# Patient Record
Sex: FEMALE | Race: WHITE | Employment: OTHER | URBAN - METROPOLITAN AREA
[De-identification: names, ages, dates, MRNs, and addresses within clinical notes are randomized per-mention and may not be internally consistent; named-entity substitution may affect disease eponyms.]

---

## 2018-08-23 ENCOUNTER — ANESTHESIA (OUTPATIENT)
Dept: OPERATING ROOM | Age: 73
End: 2018-08-23
Payer: MEDICARE

## 2018-08-23 ENCOUNTER — ANESTHESIA EVENT (OUTPATIENT)
Dept: OPERATING ROOM | Age: 73
End: 2018-08-23
Payer: MEDICARE

## 2018-08-23 ENCOUNTER — HOSPITAL ENCOUNTER (OUTPATIENT)
Age: 73
Setting detail: OUTPATIENT SURGERY
Discharge: HOME | End: 2018-08-23
Attending: OPHTHALMOLOGY | Admitting: OPHTHALMOLOGY
Payer: MEDICARE

## 2018-08-23 VITALS
HEART RATE: 69 BPM | WEIGHT: 162 LBS | HEIGHT: 59 IN | OXYGEN SATURATION: 97 % | DIASTOLIC BLOOD PRESSURE: 65 MMHG | RESPIRATION RATE: 16 BRPM | TEMPERATURE: 97.9 F | SYSTOLIC BLOOD PRESSURE: 135 MMHG | BODY MASS INDEX: 32.66 KG/M2

## 2018-08-23 VITALS — OXYGEN SATURATION: 100 % | SYSTOLIC BLOOD PRESSURE: 161 MMHG | DIASTOLIC BLOOD PRESSURE: 69 MMHG

## 2018-08-23 PROBLEM — H43.821 VITREOMACULAR ADHESION OF RIGHT EYE: Status: ACTIVE | Noted: 2018-08-23

## 2018-08-23 LAB
EKG ATRIAL RATE: 64 BPM
EKG P AXIS: 71 DEGREES
EKG P-R INTERVAL: 152 MS
EKG Q-T INTERVAL: 398 MS
EKG QRS DURATION: 76 MS
EKG QTC CALCULATION (BAZETT): 410 MS
EKG R AXIS: 1 DEGREES
EKG T AXIS: 26 DEGREES
EKG VENTRICULAR RATE: 64 BPM

## 2018-08-23 PROCEDURE — 3600000004 HC SURGERY LEVEL 4 BASE: Performed by: OPHTHALMOLOGY

## 2018-08-23 PROCEDURE — 2580000003 HC RX 258: Performed by: ANESTHESIOLOGY

## 2018-08-23 PROCEDURE — 2709999900 HC NON-CHARGEABLE SUPPLY: Performed by: OPHTHALMOLOGY

## 2018-08-23 PROCEDURE — 7100000040 HC SPAR PHASE II RECOVERY - FIRST 15 MIN: Performed by: OPHTHALMOLOGY

## 2018-08-23 PROCEDURE — 2500000003 HC RX 250 WO HCPCS: Performed by: OPHTHALMOLOGY

## 2018-08-23 PROCEDURE — 2500000003 HC RX 250 WO HCPCS: Performed by: NURSE ANESTHETIST, CERTIFIED REGISTERED

## 2018-08-23 PROCEDURE — 7100000041 HC SPAR PHASE II RECOVERY - ADDTL 15 MIN: Performed by: OPHTHALMOLOGY

## 2018-08-23 PROCEDURE — 3600000014 HC SURGERY LEVEL 4 ADDTL 15MIN: Performed by: OPHTHALMOLOGY

## 2018-08-23 PROCEDURE — 3700000000 HC ANESTHESIA ATTENDED CARE: Performed by: OPHTHALMOLOGY

## 2018-08-23 PROCEDURE — 6360000002 HC RX W HCPCS: Performed by: NURSE ANESTHETIST, CERTIFIED REGISTERED

## 2018-08-23 PROCEDURE — 93005 ELECTROCARDIOGRAM TRACING: CPT

## 2018-08-23 PROCEDURE — 3700000001 HC ADD 15 MINUTES (ANESTHESIA): Performed by: OPHTHALMOLOGY

## 2018-08-23 PROCEDURE — 6370000000 HC RX 637 (ALT 250 FOR IP): Performed by: OPHTHALMOLOGY

## 2018-08-23 PROCEDURE — 6360000002 HC RX W HCPCS: Performed by: OPHTHALMOLOGY

## 2018-08-23 RX ORDER — OXYCODONE HYDROCHLORIDE AND ACETAMINOPHEN 5; 325 MG/1; MG/1
2 TABLET ORAL PRN
Status: DISCONTINUED | OUTPATIENT
Start: 2018-08-23 | End: 2018-08-23 | Stop reason: HOSPADM

## 2018-08-23 RX ORDER — ATROPINE SULFATE 10 MG/ML
1 SOLUTION/ DROPS OPHTHALMIC
Status: COMPLETED | OUTPATIENT
Start: 2018-08-23 | End: 2018-08-23

## 2018-08-23 RX ORDER — MORPHINE SULFATE 2 MG/ML
2 INJECTION, SOLUTION INTRAMUSCULAR; INTRAVENOUS EVERY 5 MIN PRN
Status: DISCONTINUED | OUTPATIENT
Start: 2018-08-23 | End: 2018-08-23 | Stop reason: HOSPADM

## 2018-08-23 RX ORDER — LIDOCAINE HYDROCHLORIDE 20 MG/ML
INJECTION, SOLUTION INFILTRATION; PERINEURAL PRN
Status: DISCONTINUED | OUTPATIENT
Start: 2018-08-23 | End: 2018-08-23 | Stop reason: HOSPADM

## 2018-08-23 RX ORDER — TETRACAINE HYDROCHLORIDE 5 MG/ML
SOLUTION OPHTHALMIC PRN
Status: DISCONTINUED | OUTPATIENT
Start: 2018-08-23 | End: 2018-08-23 | Stop reason: HOSPADM

## 2018-08-23 RX ORDER — TOBRAMYCIN AND DEXAMETHASONE 3; 1 MG/ML; MG/ML
1 SUSPENSION/ DROPS OPHTHALMIC
Status: COMPLETED | OUTPATIENT
Start: 2018-08-23 | End: 2018-08-23

## 2018-08-23 RX ORDER — ERYTHROMYCIN 5 MG/G
OINTMENT OPHTHALMIC PRN
Status: DISCONTINUED | OUTPATIENT
Start: 2018-08-23 | End: 2018-08-23 | Stop reason: HOSPADM

## 2018-08-23 RX ORDER — GENTAMICIN SULFATE 40 MG/ML
INJECTION, SOLUTION INTRAMUSCULAR; INTRAVENOUS PRN
Status: DISCONTINUED | OUTPATIENT
Start: 2018-08-23 | End: 2018-08-23 | Stop reason: HOSPADM

## 2018-08-23 RX ORDER — TROPICAMIDE 10 MG/ML
1 SOLUTION/ DROPS OPHTHALMIC EVERY 5 MIN PRN
Status: COMPLETED | OUTPATIENT
Start: 2018-08-23 | End: 2018-08-23

## 2018-08-23 RX ORDER — ALBUTEROL SULFATE 90 UG/1
2 AEROSOL, METERED RESPIRATORY (INHALATION) EVERY 6 HOURS PRN
COMMUNITY
Start: 2016-05-26

## 2018-08-23 RX ORDER — PHENYLEPHRINE HYDROCHLORIDE 100 MG/ML
1 SOLUTION/ DROPS OPHTHALMIC EVERY 5 MIN PRN
Status: COMPLETED | OUTPATIENT
Start: 2018-08-23 | End: 2018-08-23

## 2018-08-23 RX ORDER — DIPHENHYDRAMINE HYDROCHLORIDE 50 MG/ML
12.5 INJECTION INTRAMUSCULAR; INTRAVENOUS
Status: DISCONTINUED | OUTPATIENT
Start: 2018-08-23 | End: 2018-08-23 | Stop reason: HOSPADM

## 2018-08-23 RX ORDER — LABETALOL HYDROCHLORIDE 5 MG/ML
5 INJECTION, SOLUTION INTRAVENOUS EVERY 10 MIN PRN
Status: DISCONTINUED | OUTPATIENT
Start: 2018-08-23 | End: 2018-08-23 | Stop reason: HOSPADM

## 2018-08-23 RX ORDER — BALANCED SALT SOLUTION ENRICHED WITH BICARBONATE, DEXTROSE, AND GLUTATHIONE
KIT INTRAOCULAR PRN
Status: DISCONTINUED | OUTPATIENT
Start: 2018-08-23 | End: 2018-08-23 | Stop reason: HOSPADM

## 2018-08-23 RX ORDER — FENTANYL CITRATE 50 UG/ML
25 INJECTION, SOLUTION INTRAMUSCULAR; INTRAVENOUS EVERY 5 MIN PRN
Status: DISCONTINUED | OUTPATIENT
Start: 2018-08-23 | End: 2018-08-23 | Stop reason: HOSPADM

## 2018-08-23 RX ORDER — SODIUM CHLORIDE, SODIUM LACTATE, POTASSIUM CHLORIDE, CALCIUM CHLORIDE 600; 310; 30; 20 MG/100ML; MG/100ML; MG/100ML; MG/100ML
INJECTION, SOLUTION INTRAVENOUS CONTINUOUS
Status: DISCONTINUED | OUTPATIENT
Start: 2018-08-23 | End: 2018-08-23 | Stop reason: HOSPADM

## 2018-08-23 RX ORDER — PROPOFOL 10 MG/ML
INJECTION, EMULSION INTRAVENOUS PRN
Status: DISCONTINUED | OUTPATIENT
Start: 2018-08-23 | End: 2018-08-23 | Stop reason: SDUPTHER

## 2018-08-23 RX ORDER — LIDOCAINE HYDROCHLORIDE 10 MG/ML
INJECTION, SOLUTION EPIDURAL; INFILTRATION; INTRACAUDAL; PERINEURAL PRN
Status: DISCONTINUED | OUTPATIENT
Start: 2018-08-23 | End: 2018-08-23 | Stop reason: SDUPTHER

## 2018-08-23 RX ORDER — BUPIVACAINE HYDROCHLORIDE 7.5 MG/ML
INJECTION, SOLUTION EPIDURAL; RETROBULBAR PRN
Status: DISCONTINUED | OUTPATIENT
Start: 2018-08-23 | End: 2018-08-23 | Stop reason: HOSPADM

## 2018-08-23 RX ORDER — LEVOTHYROXINE SODIUM 50 UG/1
CAPSULE ORAL
COMMUNITY
Start: 2018-03-23

## 2018-08-23 RX ORDER — ONDANSETRON 2 MG/ML
4 INJECTION INTRAMUSCULAR; INTRAVENOUS
Status: DISCONTINUED | OUTPATIENT
Start: 2018-08-23 | End: 2018-08-23 | Stop reason: HOSPADM

## 2018-08-23 RX ORDER — MIDAZOLAM HYDROCHLORIDE 1 MG/ML
INJECTION INTRAMUSCULAR; INTRAVENOUS PRN
Status: DISCONTINUED | OUTPATIENT
Start: 2018-08-23 | End: 2018-08-23 | Stop reason: SDUPTHER

## 2018-08-23 RX ORDER — OXYCODONE HYDROCHLORIDE AND ACETAMINOPHEN 5; 325 MG/1; MG/1
1 TABLET ORAL PRN
Status: DISCONTINUED | OUTPATIENT
Start: 2018-08-23 | End: 2018-08-23 | Stop reason: HOSPADM

## 2018-08-23 RX ADMIN — LIDOCAINE HYDROCHLORIDE 20 MG: 10 INJECTION, SOLUTION EPIDURAL; INFILTRATION; INTRACAUDAL; PERINEURAL at 10:03

## 2018-08-23 RX ADMIN — TROPICAMIDE 1 DROP: 10 SOLUTION/ DROPS OPHTHALMIC at 08:38

## 2018-08-23 RX ADMIN — TROPICAMIDE 1 DROP: 10 SOLUTION/ DROPS OPHTHALMIC at 08:49

## 2018-08-23 RX ADMIN — PHENYLEPHRINE HYDROCHLORIDE 1 DROP: 100 SOLUTION/ DROPS OPHTHALMIC at 08:39

## 2018-08-23 RX ADMIN — PROPOFOL 8 MG: 10 INJECTION, EMULSION INTRAVENOUS at 10:03

## 2018-08-23 RX ADMIN — MIDAZOLAM HYDROCHLORIDE 1 MG: 1 INJECTION, SOLUTION INTRAMUSCULAR; INTRAVENOUS at 09:58

## 2018-08-23 RX ADMIN — SODIUM CHLORIDE, POTASSIUM CHLORIDE, SODIUM LACTATE AND CALCIUM CHLORIDE: 600; 310; 30; 20 INJECTION, SOLUTION INTRAVENOUS at 09:03

## 2018-08-23 RX ADMIN — TROPICAMIDE 1 DROP: 10 SOLUTION/ DROPS OPHTHALMIC at 08:55

## 2018-08-23 RX ADMIN — PHENYLEPHRINE HYDROCHLORIDE 1 DROP: 100 SOLUTION/ DROPS OPHTHALMIC at 08:49

## 2018-08-23 RX ADMIN — PHENYLEPHRINE HYDROCHLORIDE 1 DROP: 100 SOLUTION/ DROPS OPHTHALMIC at 08:55

## 2018-08-23 RX ADMIN — SODIUM CHLORIDE, POTASSIUM CHLORIDE, SODIUM LACTATE AND CALCIUM CHLORIDE: 600; 310; 30; 20 INJECTION, SOLUTION INTRAVENOUS at 09:54

## 2018-08-23 RX ADMIN — TOBRAMYCIN AND DEXAMETHASONE 1 DROP: 3; 1 SUSPENSION/ DROPS OPHTHALMIC at 09:04

## 2018-08-23 RX ADMIN — ATROPINE SULFATE 1 DROP: 10 SOLUTION/ DROPS OPHTHALMIC at 08:37

## 2018-08-23 ASSESSMENT — PULMONARY FUNCTION TESTS
PIF_VALUE: 0
PIF_VALUE: 1
PIF_VALUE: 0
PIF_VALUE: 1
PIF_VALUE: 0
PIF_VALUE: 1
PIF_VALUE: 0
PIF_VALUE: 1
PIF_VALUE: 0
PIF_VALUE: 1
PIF_VALUE: 1
PIF_VALUE: 0
PIF_VALUE: 1
PIF_VALUE: 0
PIF_VALUE: 1

## 2018-08-23 ASSESSMENT — PAIN SCALES - GENERAL
PAINLEVEL_OUTOF10: 0

## 2018-08-23 ASSESSMENT — PAIN DESCRIPTION - PAIN TYPE: TYPE: SURGICAL PAIN

## 2018-08-23 ASSESSMENT — PAIN - FUNCTIONAL ASSESSMENT: PAIN_FUNCTIONAL_ASSESSMENT: 0-10

## 2018-08-23 NOTE — ANESTHESIA PRE PROCEDURE
 HERNIA REPAIR  2016    abdominal    HYSTERECTOMY, TOTAL ABDOMINAL  1994    INCONTINENCE SURGERY  2000    OVARY REMOVAL      SHOULDER ARTHROSCOPY Left 2016    TONSILLECTOMY  1979    TUBAL LIGATION       Social History   Substance Use Topics    Smoking status: Former Smoker     Packs/day: 1.00     Years: 12.00     Quit date: 2001    Smokeless tobacco: Never Used      Comment: 1 pack / day from age 40 to 2001    Alcohol use No         Vital Signs (Current)   Vitals:    18 0848   BP: (!) 140/69   Pulse: 65   Resp: 20   Temp: 97.2 °F (36.2 °C)   SpO2: 97%     Vital Signs Statistics (for past 48 hrs)     Temp  Av.2 °F (36.2 °C)  Min: 97.2 °F (36.2 °C)   Min taken time: 18 0848  Max: 97.2 °F (36.2 °C)   Max taken time: 18 0848  Pulse  Av  Min: 72   Min taken time: 18 0848  Max: 72   Max taken time: 18 0848  Resp  Av  Min: 21   Min taken time: 18 0848  Max: 21   Max taken time: 18 0848  BP  Min: 140/69   Min taken time: 18 0848  Max: 140/69   Max taken time: 18 0848  SpO2  Av %  Min: 97 %   Min taken time: 18 0848  Max: 97 %   Max taken time: 18 0848  BP Readings from Last 3 Encounters:   18 (!) 140/69   16 109/65       BMI  Body mass index is 32.72 kg/m². CBC No results found for: WBC, RBC, HGB, HCT, MCV, RDW, PLT    CMP  No results found for: NA, K, CL, CO2, BUN, CREATININE, GFRAA, AGRATIO, LABGLOM, GLUCOSE, PROT, CALCIUM, BILITOT, ALKPHOS, AST, ALT    BMP  No results found for: NA, K, CL, CO2, BUN, CREATININE, CALCIUM, GFRAA, LABGLOM, GLUCOSE    POC Testing  No results for input(s): POCGLU, POCNA, POCK, POCCL, POCBUN, POCHEMO, POCHCT in the last 72 hours.     Coags  No results found for: PROTIME, INR, APTT    HCG (If Applicable) No results found for: PREGTESTUR, PREGSERUM, HCG, HCGQUANT     ABGs No results found for: PHART, PO2ART, OII3BXD, MHI6AMO, BEART, I5SXFDYU     Type & Screen (If Applicable)  No results found for: LABABO, [address]    Radiology (If Applicable)    Cardiac Testing (If Applicable)     EKG (If Applicable) nl          Medications prior to admission:   Prior to Admission medications    Medication Sig Start Date End Date Taking?  Authorizing Provider   Levothyroxine Sodium (TIROSINT) 50 MCG CAPS TAKE ONE CAPSULE BY MOUTH DAILY 3/23/18  Yes Historical Provider, MD   albuterol sulfate  (90 Base) MCG/ACT inhaler Inhale 2 puffs into the lungs every 6 hours as needed  5/26/16  Yes Historical Provider, MD   aspirin 81 MG EC tablet Take 81 mg by mouth daily   Yes Historical Provider, MD   CALCIUM & MAGNESIUM CARBONATES PO Take 1 tablet by mouth daily    Yes Historical Provider, MD   Fiber Complete TABS Take 1 tablet by mouth daily    Yes Historical Provider, MD   Cholecalciferol (VITAMIN D) 2000 UNITS CAPS capsule Take by mouth   Yes Historical Provider, MD   atenolol (TENORMIN) 50 MG tablet Take 50 mg by mouth daily   Yes Historical Provider, MD   lisinopril (PRINIVIL;ZESTRIL) 20 MG tablet Take 20 mg by mouth daily   Yes Historical Provider, MD   lansoprazole (PREVACID) 15 MG capsule Take 15 mg by mouth daily   Yes Historical Provider, MD   miconazole nitrate (LOTRIMIN AF) 2 % AERP Apply topically 2 times daily as needed     Historical Provider, MD   Clobetasol Propionate 0.05 % LIQD Apply topically prn    Historical Provider, MD       Current medications:    Current Facility-Administered Medications   Medication Dose Route Frequency Provider Last Rate Last Dose    tobramycin-dexamethasone (TOBRADEX) ophthalmic suspension 1 drop  1 drop [facility] On [facility] to [address] [place] [address], MD        tropicamide (MYDRIACYL) 1 % ophthalmic solution 1 drop  1 drop Right [facility] Q5 Min PRN Didi Red MD   1 drop at 08/23/18 0849    phenylephrine (CHERYL-SYNEPHRINE) 10 % ophthalmic solution 1 drop  1 drop Right Eye Q5 Min PRN Didi Red MD   1 drop at 08/23/18 0652 Allergies: Allergies   Allergen Reactions    Vicodin [Hydrocodone-Acetaminophen] Nausea Only       Problem List:    Patient Active Problem List   Diagnosis Code    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 .. WNL. Irwin Lemon ...  \" felt like heart jumped \"    Pneumonia     Sinusitis     Snores        Past Surgical History:        Procedure Laterality Date    ABDOMINAL ADHESION SURGERY  06/2016    for vaginal prolapse, scar tissue, hernias, rectocele, bowel adhesions    APPENDECTOMY  1950    BACK SURGERY  2010    CHOLECYSTECTOMY  2002    COLONOSCOPY  2014    HERNIA REPAIR  11/2016    abdominal    HYSTERECTOMY, TOTAL ABDOMINAL  1994    INCONTINENCE SURGERY  2000    OVARY REMOVAL      SHOULDER ARTHROSCOPY Left 04/2016    TONSILLECTOMY  1979    TUBAL LIGATION  1991       Social History:    Social History   Substance Use Topics    Smoking status: Former Smoker     Packs/day: 1.00     Years: 12.00     Quit date: 6/22/2001    Smokeless tobacco: Never Used      Comment: 1 pack / day from age 40 to 06/22/2001    Alcohol use No                                Counseling given: Not Answered      Vital Signs (Current):   Vitals:    08/23/18 0824 08/23/18 0848   BP:  (!) 140/69   Pulse:  65   Resp:  20   Temp:  97.2 °F (36.2 °C)   TempSrc:  Temporal   SpO2:  97%   Weight: 162 lb (73.5 kg)    Height: 4' 11\" (1.499 m)                                               BP Readings from Last 3 Encounters:   08/23/18 (!) 140/69   01/06/16 109/65       NPO Status: Time of last liquid consumption: 0715 (coffee with liquid cream) Time of last solid consumption: 1800                        Date of last liquid consumption: 08/23/18                        Date of last solid food consumption: 08/22/18    BMI:   Wt Readings from Last 3 Encounters:   08/23/18 162 lb (73.5 kg)   01/06/16 156 lb (70.8 kg)     Body mass index is 32.72 kg/m². CBC: No results found for: WBC, RBC, HGB, HCT, MCV, RDW, PLT    CMP: No results found for: NA, K, CL, CO2, BUN, CREATININE, GFRAA, AGRATIO, LABGLOM, GLUCOSE, PROT, CALCIUM, BILITOT, ALKPHOS, AST, ALT    POC Tests: No results for input(s): POCGLU, POCNA, POCK, POCCL, POCBUN, POCHEMO, POCHCT in the last 72 hours. Coags: No results found for: PROTIME, INR, APTT    HCG (If Applicable): No results found for: PREGTESTUR, PREGSERUM, HCG, HCGQUANT     ABGs: No results found for: PHART, PO2ART, AOZ4MST, GPO1VYI, BEART, X2MATZRF     Type & Screen (If Applicable):  No results found for: LABABO, LABRH    Anesthesia Evaluation   no history of anesthetic complications:   Airway: Mallampati: II     Neck ROM: full   Dental:          Pulmonary:       (-) recent URI                          ROS comment: JOSE not tested   Cardiovascular:    (+) hypertension:,                ROS comment: -cp,syn,pnd,palp     Neuro/Psych:      (-) seizures and CVA           GI/Hepatic/Renal:   (+) GERD: well controlled,           Endo/Other:    (+) hyperthyroidism::., .    (-) hypothyroidism               Abdominal:           Vascular:                                        Anesthesia Plan      MAC     ASA 2     (Asa 2 hbp)  Induction: intravenous.                     Hx of needing small breathing tube      Hugo Gann MD   8/23/2018

## 2018-08-23 NOTE — ANESTHESIA POSTPROCEDURE EVALUATION
Department of Anesthesiology  Postprocedure Note    Patient: Brittny Hester  MRN: [id number]  Birthdate: [birth year]  Date of evaluation: 8/23/2018  Time:  11:38 AM     Procedure Summary     Date:  08/23/18 Room / Location:  [facility] OR  / [facility] [place] OR    Anesthesia Start:  0082 Anesthesia Stop:  2586    Procedure:  VITRECTOMY 25 GAUGE, MEMBRANE PEELING, AIR FLUID EXCHANGE, AIR GAS EXCHANGE WITH 20% SF6 (Right ) Diagnosis:  (VITREOMACULAR TRACTION RIGHT EYE)    Surgeon:  Raymundo Yu MD Responsible Provider:  Reginaldo Doll MD    Anesthesia Type:  MAC ASA Status:  2          Anesthesia Type: MAC    Rene Phase I:      Rene Phase II: Rene Score: 10    Last vitals: Reviewed and per EMR flowsheets.        Anesthesia Post Evaluation    Patient location during evaluation: PACU  Patient participation: complete - patient participated  Level of consciousness: awake and alert  Pain score: 0  Nausea & Vomiting: no nausea  Cardiovascular status: hemodynamically stable  Respiratory status: room air  Hydration status: euvolemic

## 2018-08-23 NOTE — H&P
mouth daily   Yes Historical Provider, MD   lisinopril (PRINIVIL;ZESTRIL) 20 MG tablet Take 20 mg by mouth daily   Yes Historical Provider, MD   lansoprazole (PREVACID) 15 MG capsule Take 15 mg by mouth daily   Yes Historical Provider, MD   miconazole nitrate (LOTRIMIN AF) 2 % AERP Apply topically 2 times daily as needed     Historical Provider, MD   Clobetasol Propionate 0.05 % LIQD Apply topically prn    Historical Provider, MD     Allergies  is allergic to vicodin [hydrocodone-acetaminophen]. Family History  family history includes Breast Cancer in her sister; Diabetes in her mother; Heart Disease in her father and mother; Prostate Cancer in her father. Social History   reports that she quit smoking about 17 years ago. She has a 12.00 pack-year smoking history. She has never used smokeless tobacco.  1 ppd for 20 years, quit 2001. reports that she does not drink alcohol. reports that she does not use drugs. Marital Status   Occupation none/retired    OBJECTIVE:   VITALS:  height is 4' 11\" (1.499 m) and weight is 162 lb (73.5 kg). Her temporal temperature is 97.2 °F (36.2 °C). Her blood pressure is 140/69 (abnormal) and her pulse is 65. Her respiration is 20 and oxygen saturation is 97%. CONSTITUTIONAL:alert & oriented x 3, no acute distress. SKIN:  Warm and dry, no rashes. HEAD:  Normocephalic, atraumatic   EYES: PERRL. EOMs intact. EARS:  Hearing grossly WNL. NOSE:  Nares patent. No rhinorrhea   MOUTH/THROAT:  benign  NECK:supple, no lymphadenopathy. Thick neck. LUNGS: Clear to auscultation bilaterally, no wheezes. CARDIOVASCULAR: Heart sounds are normal.  Regular rate and rhythm without murmur. ABDOMEN: soft, non tender. Rotund. EXTREMITIES: no edema bilateral lower extremities. IMPRESSIONS:   1. Vitreomacular traction syndrome  2.  has a past medical history of Alcoholism ([address] Asthma; Back pain; Bronchitis; Caffeine use;  Constipation; Death of family member (08/2018);

## 2018-08-24 NOTE — OP NOTE
[address] [facility]                                 OPERATIVE REPORT    PATIENT NAME: Sharri Nelson                 :        1945  MED REC NO:   [id number]                             ROOM:  ACCOUNT NO:   [de-identified]                           ADMIT DATE: 2018  PROVIDER:     Ruby Rosenberg    DATE OF PROCEDURE:  2018    PREOPERATIVE DIAGNOSES:  1.  Vitreomacular traction, right eye.  2.  Cataract, right eye. POSTOPERATIVE DIAGNOSES:  1.  Vitreomacular traction, right eye.  2.  Cataract, right eye. SURGEON:  Ruby Rosenberg MD    PROCEDURES:  Pars plana vitrectomy with posterior hyaloid membrane  stripping, right eye. ANESTHESIA:  Local with monitored anesthesia care. COMPLICATIONS:  None. INDICATIONS:  The patient is a pleasant 69-year-old with history of central  vision loss in the right eye. She was examined preoperatively by Dr. Kate Parker and noted to have a prominent vitreomacular traction inducing  central vision loss. The risk of progression of macular hole was  discussed. The patient requested vitreous surgery in an effort to achieve  release of the vitreomacular traction and hopefully stabilize or improve  her central vision and reduce her risk of getting a macular hole. She  understands that she has a significant cataract that will require cataract  surgery following the vitrectomy and her cataract will certainly progress  following the vitrectomy with the intravitreal gas tamponade  postoperatively. Surgical risks and benefits were discussed. Informed  consent was discussed and obtained. TECHNIQUE:  The patient was placed in the supine position on the Operating  Room table.   After the induction of intravenous access and cardiac  monitoring by the Anesthesia Service, the surgical eye was given a  retrobulbar block consisting of 3.5 cc of a 50/50 mixture of 2% lidocaine  and 0.75% Marcaine. A modified Victoria facial nerve block was also  performed on the same side. Adequate akinesia and anesthesia was obtained. The surgical eye was prepped and draped in the usual sterile manner for  vitreoretinal surgery. The lid speculum was placed. In the inferotemporal  quadrant, a #25 gauge trocar was used to place the cannula 3.5 mm posterior  to the limbus. The infusion line was placed into the eye and the tip of  the cannula was viewed through the pupil and noted to be in the vitreous  cavity, and the infusion was turned on. Two additional #25 gauge trocars  were placed superiorly, one in the superotemporal and one in the  superonasal quadrant, and each was exactly 3.5 mm posterior to the limbus. The vitreous instrumentation was checked and noted to be in good working  order. Using the operating microscope and indirect (BIOM) viewing system,  the light pipe and suction cutter were placed into the anterior mid  vitreous cavity, and a core vitrectomy was performed. The posterior hyaloid was elevated by aspirating above the disk with the  suction cutter. Once the hyaloid began to elevate, it  cleanly  from the macula and out into the periphery, and then the vitreous skirt was  trimmed with the suction cutter to the greatest degree allowed without  scleral depression. On complete removal of the macula, posterior hyaloid  membrane was thus achieved without complication, and there was no macular  hole formation. The retinal periphery was examined carefully with indirect  ophthalmoscopy, and it was intact and attached throughout. She had a mild  nasal choroidal elevation that was observed during the surgery, and because  of this, it was elected to place SF6 as opposed to an air-only bubble, and  20% SF6 was placed in the eye after an air exchange.     At the conclusion of the surgical procedure, instruments were removed from  the eye, and plugs were placed. The eye was examined carefully with  indirect ophthalmoscopy and scleral depression. The retinal periphery was  intact and attached throughout. There were no peripheral breaks. The  microscope was again brought into the field. The #25 gauge cannula were  removed, and the incisions were self-sealing. Sub-Tenon's gentamicin was  injected in the usual dose. The eye was patched with hyoscine drops,  TobraDex ointment, and an ophthalmic patch and shield. The patient  tolerated the procedure very well and was discharged to the recovery room  in stable condition with appropriate postoperative instructions.         Sukhdeep Guevara    D: 08/23/2018 12:39:29       T: 08/23/2018 15:02:07     [id number]  Job#: [id number]     Doc#: [id number]    CC:

## 2019-06-29 ENCOUNTER — HOSPITAL ENCOUNTER (EMERGENCY)
Facility: CLINIC | Age: 74
Discharge: HOME | End: 2019-06-29
Attending: EMERGENCY MEDICINE
Payer: MEDICARE

## 2019-06-29 VITALS
HEART RATE: 74 BPM | SYSTOLIC BLOOD PRESSURE: 148 MMHG | TEMPERATURE: 97.8 F | RESPIRATION RATE: 15 BRPM | HEIGHT: 59 IN | BODY MASS INDEX: 32.25 KG/M2 | OXYGEN SATURATION: 98 % | DIASTOLIC BLOOD PRESSURE: 74 MMHG | WEIGHT: 160 LBS

## 2019-06-29 DIAGNOSIS — L30.9 DERMATITIS: Primary | ICD-10-CM

## 2019-06-29 PROCEDURE — 99282 EMERGENCY DEPT VISIT SF MDM: CPT

## 2019-06-29 RX ORDER — TRIAMCINOLONE ACETONIDE 1 MG/G
CREAM TOPICAL
Qty: 1 TUBE | Refills: 0 | Status: SHIPPED | OUTPATIENT
Start: 2019-06-29 | End: 2022-06-27

## 2019-06-29 ASSESSMENT — PAIN DESCRIPTION - LOCATION: LOCATION: ARM

## 2019-06-29 ASSESSMENT — PAIN DESCRIPTION - ORIENTATION: ORIENTATION: RIGHT;MID

## 2019-06-29 ASSESSMENT — PAIN DESCRIPTION - DESCRIPTORS: DESCRIPTORS: ITCHING

## 2019-06-29 ASSESSMENT — PAIN DESCRIPTION - FREQUENCY: FREQUENCY: CONTINUOUS

## 2019-06-29 ASSESSMENT — PAIN SCALES - GENERAL
PAINLEVEL_OUTOF10: 9
PAINLEVEL_OUTOF10: 0

## 2019-06-29 ASSESSMENT — PAIN DESCRIPTION - PAIN TYPE: TYPE: ACUTE PAIN

## 2019-06-29 NOTE — ED PROVIDER NOTES
MCG/ACT INHALER    Inhale 2 puffs into the lungs every 6 hours as needed     ATENOLOL (TENORMIN) 50 MG TABLET    Take 50 mg by mouth daily    CALCIUM & MAGNESIUM CARBONATES PO    Take 1 tablet by mouth daily     CHOLECALCIFEROL (VITAMIN D) 2000 UNITS CAPS CAPSULE    Take by mouth    CLOBETASOL PROPIONATE 0.05 % LIQD    Apply topically prn    FIBER COMPLETE TABS    Take 1 tablet by mouth daily     LANSOPRAZOLE (PREVACID) 15 MG CAPSULE    Take 15 mg by mouth daily    LEVOTHYROXINE SODIUM (TIROSINT) 50 MCG CAPS    TAKE ONE CAPSULE BY MOUTH DAILY    LISINOPRIL (PRINIVIL;ZESTRIL) 20 MG TABLET    Take 20 mg by mouth daily    MICONAZOLE NITRATE (LOTRIMIN AF) 2 % AERP    Apply topically 2 times daily as needed        ALLERGIES     is allergic to vicodin [hydrocodone-acetaminophen]. FAMILY HISTORY     indicated that the status of her mother is unknown. She indicated that the status of her father is unknown. She indicated that the status of her sister is unknown.     family history includes Breast Cancer in her sister; Diabetes in her mother; Heart Disease in her father and mother; Prostate Cancer in her father. SOCIAL HISTORY      reports that she quit smoking about 18 years ago. She has a 12.00 pack-year smoking history. She has never used smokeless tobacco. She reports that she does not drink alcohol or use drugs. PHYSICAL EXAM    (up to 7 for level 4, 8 or more for level 5)   INITIAL VITALS:  height is 4' 11\" (1.499 m) and weight is 72.6 kg (160 lb). Her oral temperature is 97.8 °F (36.6 °C). Her blood pressure is 153/75 (abnormal) and her pulse is 73. Her respiration is 16 and oxygen saturation is 96%. Physical Exam   Constitutional: She appears well-developed and well-nourished. HENT:   Head: Normocephalic. Eyes: Conjunctivae are normal.   Neck: Normal range of motion. Neck supple. Musculoskeletal: Normal range of motion. Neurological: She is alert.    GCS of 15 with no focal deficits appreciated Skin:   The patient is noted to have an erythematous raised linear rash to her right forearm there are 2 areas that are linear suggestive of a contact dermatitis there is also one isolated area that is raised and erythematous I did not appreciate any signs of infection good pulses motor sensation in the right upper extremity   Psychiatric: She has a normal mood and affect. Nursing note and vitals reviewed. DIFFERENTIAL DIAGNOSIS/ MDM:     The patient presents with a dermatitis has the appearance of a contact dermatitis so I will go ahead and write a prescription for Kenalog ointment recommending she may also take Zyrtec or Benadryl she is to return to the ER for worsening symptoms signs of infection and fever increased swelling redness or other concerns otherwise to follow-up with her family doctor within the next few days    DIAGNOSTIC RESULTS         LABS:  No results found for this visit on 06/29/19. EMERGENCY DEPARTMENT COURSE:   Vitals:    Vitals:    06/29/19 1810   BP: (!) 153/75   Pulse: 73   Resp: 16   Temp: 97.8 °F (36.6 °C)   TempSrc: Oral   SpO2: 96%   Weight: 72.6 kg (160 lb)   Height: 4' 11\" (1.499 m)     -------------------------  BP: (!) 153/75, Temp: 97.8 °F (36.6 °C), Pulse: 73, Resp: 16      RE-EVALUATION:  At this time the patient is without objective evidence of an acute process requiring hospitalization or inpatient management. They have remained hemodynamically stable throughout their entire ED visit and are stable for discharge with outpatient follow-up. The patient understands that at this time there is no evidence for a more malignant underlying process, but the patient also understands that early in the process of an illness or injury, an emergency department workup can be falsely reassuring.   Routine discharge counseling was given, and the patient understands that worsening, changing or persistent symptoms should prompt an immediate call or follow up with their primary

## 2021-11-12 ENCOUNTER — HOSPITAL ENCOUNTER (EMERGENCY)
Facility: CLINIC | Age: 76
Discharge: HOME | End: 2021-11-12
Attending: EMERGENCY MEDICINE
Payer: MEDICARE

## 2021-11-12 VITALS
HEIGHT: 59 IN | TEMPERATURE: 97.9 F | DIASTOLIC BLOOD PRESSURE: 72 MMHG | WEIGHT: 168 LBS | OXYGEN SATURATION: 97 % | SYSTOLIC BLOOD PRESSURE: 189 MMHG | BODY MASS INDEX: 33.87 KG/M2 | HEART RATE: 79 BPM | RESPIRATION RATE: 16 BRPM

## 2021-11-12 DIAGNOSIS — B36.9 FUNGAL DERMATITIS: Primary | ICD-10-CM

## 2021-11-12 PROCEDURE — 99284 EMERGENCY DEPT VISIT MOD MDM: CPT

## 2021-11-12 RX ORDER — FLUCONAZOLE 150 MG/1
150 TABLET ORAL ONCE
Qty: 1 TABLET | Refills: 0 | Status: SHIPPED | OUTPATIENT
Start: 2021-11-12 | End: 2021-11-12

## 2021-11-12 RX ORDER — NYSTATIN 100000 U/G
CREAM TOPICAL
Qty: 30 G | Refills: 0 | Status: SHIPPED | OUTPATIENT
Start: 2021-11-12

## 2021-11-12 NOTE — ED PROVIDER NOTES
[facility]  [facility]  Phone: [facility] [place], [place] ED  EMERGENCY DEPARTMENT ENCOUNTER      Pt Name: Nasrin Strickland  MRN: [id number]  [place] [birth year]  Date of evaluation: 2021  Provider: Governor Jayme DO    CHIEF COMPLAINT       Chief Complaint   Patient presents with    Rash     under right breast, started a week ago         HISTORY OF PRESENT ILLNESS   (Location/Symptom, Timing/Onset,Context/Setting, Quality, Duration, Modifying Factors, Severity)  Note limiting factors. Nasrin Strickland is a 77 y.o. female who presents to the emergency department for the evaluation of a rash under her right breast.  States is been there about a week. States she has been using some triamcinolone at home that she had but is not improving. Patient does describe this is itchy and a little bit uncomfortable. No other rash. No fever. Otherwise feeling well in her normal self    Nursing Notes were reviewed. REVIEW OF SYSTEMS    (2-9systems for level 4, 10 or more for level 5)     Review of Systems   Constitutional: Negative for fever. Skin:        Rash under right breast   Neurological: Negative for weakness. Except asnoted above the remainder of the review of systems was reviewed and negative. PAST MEDICAL HISTORY     Past Medical History:   Diagnosis Date    Alcoholism ([address]     Asthma     Back pain     Bronchitis     Caffeine use     3-4 coffee / day    Constipation     Death of family member 2018    son ( suicide after prolonged illness )  2018    Depression     Difficult intubation     pt reports trouble in the past, small airway    GERD (gastroesophageal reflux disease)     Hypertension     Hyperthyroidism     Incontinence     Irregular heart beat      had stress test with anomally, had heart cath. .. WNL. Bethany Alves ...  \" felt like heart jumped \"    Pneumonia     Sinusitis     Snores          SURGICAL HISTORY Past Surgical History:   Procedure Laterality Date    ABDOMINAL ADHESION SURGERY  06/2016    for vaginal prolapse, scar tissue, hernias, rectocele, bowel adhesions    APPENDECTOMY  1950    BACK SURGERY  2010    CHOLECYSTECTOMY  2002    COLONOSCOPY  2014    HERNIA REPAIR  11/2016    abdominal    HYSTERECTOMY, TOTAL ABDOMINAL  1994    INCONTINENCE SURGERY  2000    OVARY REMOVAL      [place] RELEAS VITREOUS,SUBRET/CHOROID FLUID Right 8/23/2018    VITRECTOMY 25 GAUGE, MEMBRANE PEELING, AIR FLUID EXCHANGE, AIR GAS EXCHANGE WITH 20% SF6 performed by Jose Luis Lewis MD at [place] [address] Left 04/2016   [facility]    TUBAL LIGATION  1991    VITRECTOMY Right 08/23/2018    VITRECTOMY 25 GAUGE, MEMBRANE PEELING, AIR FLUID EXCHANGE, AIR GAS EXCHANGE WITH 20% SF6 (Right          CURRENT MEDICATIONS     Previous Medications    ALBUTEROL SULFATE  (90 BASE) MCG/ACT INHALER    Inhale 2 puffs into the lungs every 6 hours as needed     ATENOLOL (TENORMIN) 50 MG TABLET    Take 50 mg by mouth daily    CALCIUM & MAGNESIUM CARBONATES PO    Take 1 tablet by mouth daily     CHOLECALCIFEROL (VITAMIN D) 2000 UNITS CAPS CAPSULE    Take by mouth    CLOBETASOL PROPIONATE 0.05 % LIQD    Apply topically prn    FIBER COMPLETE TABS    Take 1 tablet by mouth daily     LANSOPRAZOLE (PREVACID) 15 MG CAPSULE    Take 15 mg by mouth daily    LEVOTHYROXINE SODIUM (TIROSINT) 50 MCG CAPS    TAKE ONE CAPSULE BY MOUTH DAILY    LISINOPRIL (PRINIVIL;ZESTRIL) 20 MG TABLET    Take 20 mg by mouth daily    MICONAZOLE NITRATE (LOTRIMIN AF) 2 % AERP    Apply topically 2 times daily as needed     TRIAMCINOLONE (KENALOG) 0.1 % CREAM    Apply topically 2 times daily for 1 week.        ALLERGIES     Vicodin [hydrocodone-acetaminophen]    FAMILY HISTORY       Family History   Problem Relation Age of Onset    Prostate Cancer Father     Heart Disease Father     Heart Disease Mother     Diabetes Mother     Breast Cancer Sister           SOCIAL HISTORY       Social History     Socioeconomic History    Marital status:      Spouse name: Romeo Herrera    Number of children: 3    Years of education: None    Highest education level: None   Occupational History    Occupation: Retired   Tobacco Use    Smoking status: Former Smoker     Packs/day: 1.00     Years: 12.00     Pack years: 12.00     Quit date: 2001     Years since quittin.4    Smokeless tobacco: Never Used    Tobacco comment: 1 pack / day from age 40 to 2001   Vaping Use    Vaping Use: Never used   Substance and Sexual Activity    Alcohol use: No     Alcohol/week: 0.0 standard drinks    Drug use: No    Sexual activity: Yes     Partners: Male   Other Topics Concern    None   Social History Narrative    None     Social Determinants of Health     Financial Resource Strain:     Difficulty of Paying Living Expenses: Not on file   Food Insecurity:     Worried About Running Out of Food in the Last Year: Not on file    Carlos A of Food in the Last Year: Not on file   Transportation Needs:     Lack of Transportation (Medical): Not on file    Lack of Transportation (Non-Medical):  Not on file   Physical Activity:     Days of Exercise per Week: Not on file    Minutes of Exercise per Session: Not on file   Stress:     Feeling of Stress : Not on file   Social Connections:     Frequency of Communication with Friends and Family: Not on file    Frequency of Social Gatherings with Friends and Family: Not on file    Attends Church Services: Not on file    Active Member of Clubs or Organizations: Not on file    Attends Club or Organization Meetings: Not on file    Marital Status: Not on file   Intimate Partner Violence:     Fear of Current or Ex-Partner: Not on file    Emotionally Abused: Not on file    Physically Abused: Not on file    Sexually Abused: Not on file   Housing Stability:     Unable to Pay for Housing in the Last Year: Not DEPARTMENT COURSE and DIFFERENTIAL DIAGNOSIS/MDM:   Vitals:    Vitals:    11/12/21 1023   BP: (!) 189/72   Pulse: 79   Resp: 16   Temp: 97.9 °F (36.6 °C)   TempSrc: Oral   SpO2: 97%   Weight: 76.2 kg (168 lb)   Height: 4' 11\" (1.499 m)       Patient presents to the emergency department with a complaint described above. Vital signs show hypertension, otherwise unremarkable. Physical exam reveals rash as described. I am going to treat with nystatin cream as well as 1 dose of oral Diflucan and I have recommended she follow-up with PCP for reevaluation and further treatment    At this time the patient is without objective evidence of an acute process requiring hospitalization or inpatient management. They have remained hemodynamically stable and are stable for discharge with outpatient follow-up. Standard anticipatory guidance given to patient upon discharge. Have given them a specific time frame in which to follow-up and who to follow-up with. I have also advised them that they should return to the emergency department if they get worse, or not getting better or develop any new or concerning symptoms. Patient demonstrates understanding. PROCEDURES:  Unless otherwise noted below, none     Procedures    FINAL IMPRESSION      1. Fungal dermatitis          DISPOSITION/PLAN   DISPOSITION Decision To Discharge 11/12/2021 10:26:55 AM      PATIENT REFERRED TO:  Grace Bolden MD  [facility]  [phone]    In 1 week        DISCHARGE MEDICATIONS:  New Prescriptions    FLUCONAZOLE (DIFLUCAN) 150 MG TABLET    Take 1 tablet by mouth once for 1 dose    NYSTATIN (MYCOSTATIN) 310136 UNIT/GM CREAM    Apply topically 2 times daily.           (Please note that portions of this note were completed with a voice recognition program.  Efforts were made to edit the dictations but occasionally words are mis-transcribed.)    Bren Samson DO (electronically signed)  Board Certified Emergency

## 2022-06-27 ENCOUNTER — APPOINTMENT (OUTPATIENT)
Dept: CT IMAGING | Facility: CLINIC | Age: 77
End: 2022-06-27
Payer: MEDICARE

## 2022-06-27 ENCOUNTER — HOSPITAL ENCOUNTER (EMERGENCY)
Facility: CLINIC | Age: 77
Discharge: HOME | End: 2022-06-27
Attending: EMERGENCY MEDICINE
Payer: MEDICARE

## 2022-06-27 VITALS
TEMPERATURE: 98.8 F | HEIGHT: 59 IN | HEART RATE: 77 BPM | OXYGEN SATURATION: 94 % | WEIGHT: 157 LBS | BODY MASS INDEX: 31.65 KG/M2 | DIASTOLIC BLOOD PRESSURE: 75 MMHG | SYSTOLIC BLOOD PRESSURE: 146 MMHG | RESPIRATION RATE: 16 BRPM

## 2022-06-27 DIAGNOSIS — R06.00 DYSPNEA, UNSPECIFIED TYPE: Primary | ICD-10-CM

## 2022-06-27 LAB
ABSOLUTE EOS #: 0.2 K/UL (ref 0–0.4)
ABSOLUTE LYMPH #: 2 K/UL (ref 1–4.8)
ABSOLUTE MONO #: 0.5 K/UL (ref 0.1–1.2)
ANION GAP SERPL CALCULATED.3IONS-SCNC: 10 MMOL/L (ref 9–17)
BASOPHILS # BLD: 1 % (ref 0–2)
BASOPHILS ABSOLUTE: 0 K/UL (ref 0–0.2)
BUN BLDV-MCNC: 21 MG/DL (ref 8–23)
CALCIUM SERPL-MCNC: 9.5 MG/DL (ref 8.6–10.4)
CHLORIDE BLD-SCNC: 102 MMOL/L (ref 98–107)
CO2: 28 MMOL/L (ref 20–31)
CREAT SERPL-MCNC: 1 MG/DL (ref 0.5–0.9)
D-DIMER QUANTITATIVE: 0.68 MG/L FEU
EOSINOPHILS RELATIVE PERCENT: 2 % (ref 1–4)
GFR AFRICAN AMERICAN: >60 ML/MIN
GFR NON-AFRICAN AMERICAN: 54 ML/MIN
GFR SERPL CREATININE-BSD FRML MDRD: ABNORMAL ML/MIN/{1.73_M2}
GLUCOSE BLD-MCNC: 121 MG/DL (ref 70–99)
HCT VFR BLD CALC: 41.6 % (ref 36–46)
HEMOGLOBIN: 13.3 G/DL (ref 12–16)
LYMPHOCYTES # BLD: 20 % (ref 24–44)
MCH RBC QN AUTO: 28.9 PG (ref 26–34)
MCHC RBC AUTO-ENTMCNC: 31.9 G/DL (ref 31–37)
MCV RBC AUTO: 90.6 FL (ref 80–100)
MONOCYTES # BLD: 5 % (ref 2–11)
PDW BLD-RTO: 15.3 % (ref 12.5–15.4)
PLATELET # BLD: 266 K/UL (ref 140–450)
PMV BLD AUTO: 8 FL (ref 6–12)
POTASSIUM SERPL-SCNC: 4.3 MMOL/L (ref 3.7–5.3)
PRO-BNP: 45 PG/ML
RBC # BLD: 4.59 M/UL (ref 4–5.2)
SEG NEUTROPHILS: 72 % (ref 36–66)
SEGMENTED NEUTROPHILS ABSOLUTE COUNT: 7.3 K/UL (ref 1.8–7.7)
SODIUM BLD-SCNC: 140 MMOL/L (ref 135–144)
TROPONIN, HIGH SENSITIVITY: 21 NG/L (ref 0–14)
TROPONIN, HIGH SENSITIVITY: 21 NG/L (ref 0–14)
WBC # BLD: 10.1 K/UL (ref 3.5–11)

## 2022-06-27 PROCEDURE — 84484 ASSAY OF TROPONIN QUANT: CPT

## 2022-06-27 PROCEDURE — 85025 COMPLETE CBC W/AUTO DIFF WBC: CPT

## 2022-06-27 PROCEDURE — 83880 ASSAY OF NATRIURETIC PEPTIDE: CPT

## 2022-06-27 PROCEDURE — 36415 COLL VENOUS BLD VENIPUNCTURE: CPT

## 2022-06-27 PROCEDURE — 85379 FIBRIN DEGRADATION QUANT: CPT

## 2022-06-27 PROCEDURE — 2580000003 HC RX 258: Performed by: EMERGENCY MEDICINE

## 2022-06-27 PROCEDURE — 71260 CT THORAX DX C+: CPT

## 2022-06-27 PROCEDURE — 6360000004 HC RX CONTRAST MEDICATION: Performed by: EMERGENCY MEDICINE

## 2022-06-27 PROCEDURE — 99285 EMERGENCY DEPT VISIT HI MDM: CPT

## 2022-06-27 PROCEDURE — 80048 BASIC METABOLIC PNL TOTAL CA: CPT

## 2022-06-27 PROCEDURE — 93005 ELECTROCARDIOGRAM TRACING: CPT | Performed by: EMERGENCY MEDICINE

## 2022-06-27 RX ORDER — SODIUM CHLORIDE 0.9 % (FLUSH) 0.9 %
10 SYRINGE (ML) INJECTION PRN
Status: DISCONTINUED | OUTPATIENT
Start: 2022-06-27 | End: 2022-06-27 | Stop reason: HOSPADM

## 2022-06-27 RX ORDER — 0.9 % SODIUM CHLORIDE 0.9 %
70 INTRAVENOUS SOLUTION INTRAVENOUS ONCE
Status: COMPLETED | OUTPATIENT
Start: 2022-06-27 | End: 2022-06-27

## 2022-06-27 RX ADMIN — SODIUM CHLORIDE, PRESERVATIVE FREE 10 ML: 5 INJECTION INTRAVENOUS at 17:10

## 2022-06-27 RX ADMIN — SODIUM CHLORIDE 70 ML: 9 INJECTION, SOLUTION INTRAVENOUS at 17:09

## 2022-06-27 RX ADMIN — IOPAMIDOL 75 ML: 755 INJECTION, SOLUTION INTRAVENOUS at 17:09

## 2022-06-27 ASSESSMENT — PAIN - FUNCTIONAL ASSESSMENT: PAIN_FUNCTIONAL_ASSESSMENT: NONE - DENIES PAIN

## 2022-06-27 NOTE — ED PROVIDER NOTES
[facility]  [address] [facility]  Phone: [phone]  EMERGENCY DEPARTMENT ENCOUNTER      Pt Name: Wale Osborn  MRN: [id number]  [place] [birth year]  Date of evaluation: 6/27/2022    CHIEF COMPLAINT       Chief Complaint   Patient presents with    Shortness of Breath     started Saturday    Cough    Dizziness       HISTORY OF PRESENT ILLNESS    Wale Osborn is a 77 y.o. female who presents to the emergency department with shortness of breath that started Saturday. Went to put in [employer] and in the process of walking up to the boat felt short of breath and felt lightheaded. She denies any chest pain. From then on she is felt winded when getting around. Denies fevers or chills positive mild cough no congestion. History of asthma. Denies significant cardiac history. No leg pain or leg swelling. Denies any other complaints. Her doctor could not get her in for another week    REVIEW OF SYSTEMS       Constitutional: No fevers or chills   HEENT: No sore throat, rhinorrhea, or earache   Eyes: No blurry vision or double vision no drainage   Cardiovascular: No chest pain or tachycardia   Respiratory: No wheezing positive shortness of breath positive cough  Gastrointestinal: No nausea, vomiting, diarrhea, constipation, or abdominal pain   : No hematuria or dysuria   Musculoskeletal: No swelling or pain   Skin: No rash   Neurological: No focal neurologic complaints, paresthesias, weakness, or headache     PAST MEDICAL HISTORY    has a past medical history of Alcoholism ([facility] [address] Asthma, Back pain, Bronchitis, Caffeine use, Constipation, Death of family member, Depression, Difficult intubation, GERD (gastroesophageal reflux disease), Hypertension, Hyperthyroidism, Incontinence, Irregular heart beat, Pneumonia, Sinusitis, and Snores. SURGICAL HISTORY      has a past surgical history that includes Appendectomy (1950); Cholecystectomy (2002); Colonoscopy (2014);  Hysterectomy, total abdominal (1994); Ovary removal; Tubal ligation (1991); Incontinence surgery (2000); Tonsillectomy (1979); back surgery (2010); Shoulder arthroscopy (Left, 04/2016); hernia repair (11/2016); Abdominal adhesion surgery (06/2016); vitrectomy (Right, 08/23/2018); and pr releas vitreous,subret/choroid fluid (Right, 8/23/2018). CURRENT MEDICATIONS       Previous Medications    ALBUTEROL SULFATE  (90 BASE) MCG/ACT INHALER    Inhale 2 puffs into the lungs every 6 hours as needed     ATENOLOL (TENORMIN) 50 MG TABLET    Take 50 mg by mouth daily    CALCIUM & MAGNESIUM CARBONATES PO    Take 1 tablet by mouth daily     CHOLECALCIFEROL (VITAMIN D) 2000 UNITS CAPS CAPSULE    Take by mouth    CLOBETASOL PROPIONATE 0.05 % LIQD    Apply topically prn    FIBER COMPLETE TABS    Take 1 tablet by mouth daily     LANSOPRAZOLE (PREVACID) 15 MG CAPSULE    Take 15 mg by mouth daily    LEVOTHYROXINE SODIUM (TIROSINT) 50 MCG CAPS    TAKE ONE CAPSULE BY MOUTH DAILY    LISINOPRIL (PRINIVIL;ZESTRIL) 20 MG TABLET    Take 20 mg by mouth daily    MICONAZOLE NITRATE (LOTRIMIN AF) 2 % AERP    Apply topically 2 times daily as needed     NYSTATIN (MYCOSTATIN) 677980 UNIT/GM CREAM    Apply topically 2 times daily. ALLERGIES     is allergic to vicodin [hydrocodone-acetaminophen]. FAMILY HISTORY     She indicated that the status of her mother is unknown. She indicated that the status of her father is unknown. She indicated that the status of her sister is unknown.     family history includes Breast Cancer in her sister; Diabetes in her mother; Heart Disease in her father and mother; Prostate Cancer in her father. SOCIAL HISTORY      reports that she quit smoking about 21 years ago. She has a 12.00 pack-year smoking history. She has never used smokeless tobacco. She reports that she does not drink alcohol and does not use drugs.     PHYSICAL EXAM       ED Triage Vitals [06/27/22 1546]   BP Temp Temp src Heart Rate Resp SpO2 Height Weight   (!) 146/75 -- -- 77 16 94 % 4' 11\" (1.499 m) 157 lb (71.2 kg)     Constitutional: Alert, oriented x3, nontoxic, answering questions appropriately, acting properly for age, in no acute distress   HEENT: Extraocular muscles intact,  Neck: Trachea midline   Cardiovascular: Regular rhythm and rate no murmurs   Respiratory: Diminished to auscultation bilaterally no wheezes, rhonchi, rales, no respiratory distress no tachypnea no retractions no hypoxia  Gastrointestinal: Soft, nontender, nondistended, positive bowel sounds. No rebound, rigidity, or guarding. Musculoskeletal: No extremity pain or swelling   Neurologic: Moving all 4 extremities without difficulty there are no gross focal neurologic deficits   Skin: Warm and dry   DIFFERENTIAL DIAGNOSIS/ MDM:     IV labs. EKG. Chest imaging. DIAGNOSTIC RESULTS     EKG: All EKG's are interpreted by the Emergency Department Physician who either signs or Co-signs this chart in the absence of a cardiologist.    1605 sinus rhythm rate 76  cures 66  no acute ST or T wave changes. 1811 sinus rhythm rate 71  QRS 70  no acute ST or T wave changes.     Not indicated unless otherwise documented above    LABS:  Results for orders placed or performed during the hospital encounter of 06/27/22   CBC with Auto Differential   Result Value Ref Range    WBC 10.1 3.5 - 11.0 k/uL    RBC 4.59 4.0 - 5.2 m/uL    Hemoglobin 13.3 12.0 - 16.0 g/dL    Hematocrit 41.6 36 - 46 %    MCV 90.6 80 - 100 fL    MCH 28.9 26 - 34 pg    MCHC 31.9 31 - 37 g/dL    RDW 15.3 12.5 - 15.4 %    Platelets 813 231 - 457 k/uL    MPV 8.0 6.0 - 12.0 fL    Seg Neutrophils 72 (H) 36 - 66 %    Lymphocytes 20 (L) 24 - 44 %    Monocytes 5 2 - 11 %    Eosinophils % 2 1 - 4 %    Basophils 1 0 - 2 %    Segs Absolute 7.30 1.8 - 7.7 k/uL    Absolute Lymph # 2.00 1.0 - 4.8 k/uL    Absolute Mono # 0.50 0.1 - 1.2 k/uL    Absolute Eos # 0.20 0.0 - 0.4 k/uL    Basophils Absolute 0.00 0.0 - 0.2 k/uL   Basic Metabolic Panel   Result Value Ref Range    Glucose 121 (H) 70 - 99 mg/dL    BUN 21 8 - 23 mg/dL    CREATININE 1.00 (H) 0.50 - 0.90 mg/dL    Calcium 9.5 8.6 - 10.4 mg/dL    Sodium 140 135 - 144 mmol/L    Potassium 4.3 3.7 - 5.3 mmol/L    Chloride 102 98 - 107 mmol/L    CO2 28 20 - 31 mmol/L    Anion Gap 10 9 - 17 mmol/L    GFR Non-African American 54 (L) >60 mL/min    GFR African American >60 >60 mL/min    GFR Comment         Troponin   Result Value Ref Range    Troponin, High Sensitivity 21 (H) 0 - 14 ng/L   D-Dimer, Quantitative   Result Value Ref Range    D-Dimer, Quant 0.68 mg/L FEU   Brain Natriuretic Peptide   Result Value Ref Range    Pro-BNP 45 <300 pg/mL   Troponin   Result Value Ref Range    Troponin, High Sensitivity 21 (H) 0 - 14 ng/L       Not indicated unless otherwise documented above    RADIOLOGY:   I reviewed the radiologist interpretations:    CT CHEST PULMONARY EMBOLISM W CONTRAST   Final Result   No evidence of pulmonary embolism. Mild bibasilar subsegmental atelectasis in the setting of elevation of the   right hemidiaphragm. 3 mm superior segment right lower lobe ground-glass nodule. See   recommendation below. Coronary artery disease predominating in the proximal LAD. RECOMMENDATIONS:   3 mm right ground-glass pulmonary nodule. No routine follow-up imaging is   recommended per Fleischner Society Guidelines. These guidelines do not apply to immunocompromised patients and patients with   cancer. Follow up in patients with significant comorbidities as clinically   warranted. For lung cancer screening, adhere to Lung-RADS guidelines. Reference: Radiology. 2017; 284(1):228-43.              Not indicated unless otherwise documented above    EMERGENCY DEPARTMENT COURSE:     The patient was given the following medications:  Orders Placed This Encounter   Medications    0.9 % sodium chloride bolus    iopamidol (ISOVUE-370) 76 % injection 75 mL    sodium chloride flush 0.9 % injection 10 mL        Vitals:   -------------------------  BP (!) 146/75   Pulse 77   Temp 98.8 °F (37.1 °C) (Oral)   Resp 16   Ht 4' 11\" (1.499 m)   Wt 71.2 kg (157 lb)   SpO2 94%   BMI 31.71 kg/m²     6:20 PM resting comfortably no acute distress. Troponin of 21 normal BNP. Dimer was elevated prompting a CT of the chest which was negative for pulmonary embolism. There is a groundglass nodule noted which does not require follow-up imaging according to the radiologist report. Normal CBC no anemia. Electrolytes and kidney function also normal.  Awaiting second troponin. Repeat EKG no ST elevations. Unclear etiology for shortness of breath will require cardiology follow-up and possibly echocardiogram or stress test.      7 PM repeat troponin the same. Recommend following up with family physician for reevaluation will more than likely need stress test and echocardiogram.  Return if worsening symptoms or other concerns. The patient and her  understands that at this time there is no evidence for a more malignant underlying process, but also understands that early in the process of an illness or injury, an emergency department workup can be falsely reassuring. Routine discharge counseling was given, and it is understood that worsening, changing or persistent symptoms should prompt an immediate call or follow up with their primary physician or return to the emergency department. The importance of appropriate follow up was also discussed. I have reviewed the disposition diagnosis. I have answered the questions and given discharge instructions. There was voiced understanding of these instructions and no further questions or complaints. CRITICAL CARE:    None    CONSULTS:    None    PROCEDURES:    None      OARRS Report if indicated             FINAL IMPRESSION      1.  Dyspnea, unspecified type          DISPOSITION/PLAN   DISPOSITION          CONDITION ON DISPOSITION: STABLE PATIENT REFERRED TO:  MD [address]ber [id number]  [facility] [address] [facility]  [phone]    Call in 1 day        DISCHARGE MEDICATIONS:  New Prescriptions    No medications on file       (Please note that portions of this note were completed with a voice recognition program.  Efforts were made to edit the dictations but occasionally words are mis-transcribed.)    Rose Delgadillo DO   Attending Emergency Physician     Rose Delgadillo DO  06/27/22 4093

## 2022-06-27 NOTE — ED NOTES
Pt presented to the ED c/o shortness of breath that started on Saturday around 1300. Report she experienced dyspnea with exertion. Reports she has a Hx of asthma. Reports she took her albuterol inhaler this morning but it did not help her. Reports she is feeling slightly dizzy. Denies fevers and chills. Pt alert and oriented.       Margaux Gustafson RN  06/27/22 0026

## 2022-06-28 LAB
EKG ATRIAL RATE: 71 BPM
EKG ATRIAL RATE: 76 BPM
EKG P AXIS: 63 DEGREES
EKG P AXIS: 64 DEGREES
EKG P-R INTERVAL: 156 MS
EKG P-R INTERVAL: 156 MS
EKG Q-T INTERVAL: 376 MS
EKG Q-T INTERVAL: 388 MS
EKG QRS DURATION: 66 MS
EKG QRS DURATION: 70 MS
EKG QTC CALCULATION (BAZETT): 421 MS
EKG QTC CALCULATION (BAZETT): 423 MS
EKG R AXIS: -6 DEGREES
EKG R AXIS: -8 DEGREES
EKG T AXIS: 21 DEGREES
EKG T AXIS: 34 DEGREES
EKG VENTRICULAR RATE: 71 BPM
EKG VENTRICULAR RATE: 76 BPM

## (undated) DEVICE — RETINA PK

## (undated) DEVICE — SOLUTION IRRIG 1000ML PENTALYTE PLAS POUR BTL TIS U SOL

## (undated) DEVICE — NEEDLE FLTR 18GA L1.5IN MEM THK5UM BLNT DISP

## (undated) DEVICE — SYRINGE, LUER LOCK, 10ML: Brand: MEDLINE

## (undated) DEVICE — NEEDLE HYPO 30GA L0.5IN BGE POLYPR HUB S STL REG BVL STR

## (undated) DEVICE — STANDARD HYPODERMIC NEEDLE,ALUMINUM HUB: Brand: MONOJECT

## (undated) DEVICE — 1 EACH 40411 STERILE DISPOSABLE SUPER VIEW® LENS SET & 1 EACH 40100 STERILE MICROSCOPE DRAPE: Brand: SUPER VIEW® PACK

## (undated) DEVICE — OCCLUDER EYE POLYETH HYPOALRG ADH TAPE W/O PINHOLE

## (undated) DEVICE — SURGICAL PROCEDURE PACK 25 GA VITRECTOMY

## (undated) DEVICE — PREP SOL PVP IODINE 4%  4 OZ/BTL

## (undated) DEVICE — AGENT VISCOELASTIC 1ML 2% HYDROXYPROPYL METHCELL PRELD GLS

## (undated) DEVICE — 3 ML SYRINGE LUER-LOCK TIP: Brand: MONOJECT

## (undated) DEVICE — 1 ML TUBERCULIN SYRINGE LUER-LOCK TIP: Brand: MONOJECT

## (undated) DEVICE — 60 ML SYRINGE LUER-LOCK TIP: Brand: MONOJECT

## (undated) DEVICE — 25GA EZPASS SOFT TIP CANNULA BOX OF 5: Brand: VORTEX SURGICAL INC

## (undated) DEVICE — DISCONTINUED USE 435154 INVENTORY EXISTS CSFILTER SYRINGE BL ST SLGS033SS (50/BX)

## (undated) DEVICE — Z DISCONTINUED NO SUB IDED SPONGE OPHTH EYE SPEAR SURG STRL